# Patient Record
Sex: MALE | Race: OTHER
[De-identification: names, ages, dates, MRNs, and addresses within clinical notes are randomized per-mention and may not be internally consistent; named-entity substitution may affect disease eponyms.]

---
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
## 2020-09-29 ENCOUNTER — HOSPITAL ENCOUNTER (OUTPATIENT)
Dept: HOSPITAL 62 - OD | Age: 20
End: 2020-09-29
Attending: PEDIATRICS
Payer: COMMERCIAL

## 2020-09-29 DIAGNOSIS — N34.2: Primary | ICD-10-CM

## 2020-09-29 LAB — CHLAM PCR: DETECTED

## 2020-09-29 PROCEDURE — 36415 COLL VENOUS BLD VENIPUNCTURE: CPT

## 2020-09-29 PROCEDURE — 87205 SMEAR GRAM STAIN: CPT

## 2020-09-29 PROCEDURE — 86701 HIV-1ANTIBODY: CPT

## 2020-09-29 PROCEDURE — 87491 CHLMYD TRACH DNA AMP PROBE: CPT

## 2020-09-29 PROCEDURE — 87591 N.GONORRHOEAE DNA AMP PROB: CPT

## 2020-09-29 PROCEDURE — 87070 CULTURE OTHR SPECIMN AEROBIC: CPT

## 2020-09-29 PROCEDURE — 86592 SYPHILIS TEST NON-TREP QUAL: CPT
